# Patient Record
Sex: MALE | Race: WHITE | URBAN - METROPOLITAN AREA
[De-identification: names, ages, dates, MRNs, and addresses within clinical notes are randomized per-mention and may not be internally consistent; named-entity substitution may affect disease eponyms.]

---

## 2017-05-26 ENCOUNTER — HOSPITAL ENCOUNTER (OUTPATIENT)
Dept: PHYSICAL MEDICINE AND REHAB | Facility: CLINIC | Age: 52
Discharge: HOME | End: 2017-05-26
Attending: NURSE PRACTITIONER

## 2017-05-26 DIAGNOSIS — M54.41 CHRONIC RIGHT-SIDED LOW BACK PAIN WITH RIGHT-SIDED SCIATICA: ICD-10-CM

## 2017-05-26 DIAGNOSIS — M51.369 DEGENERATIVE DISC DISEASE, LUMBAR: ICD-10-CM

## 2017-05-26 DIAGNOSIS — M47.816 LUMBAR FACET ARTHROPATHY: ICD-10-CM

## 2017-05-26 DIAGNOSIS — M79.18 MYOFASCIAL PAIN: ICD-10-CM

## 2017-05-26 DIAGNOSIS — G89.29 CHRONIC RIGHT-SIDED LOW BACK PAIN WITH RIGHT-SIDED SCIATICA: ICD-10-CM

## 2017-05-26 DIAGNOSIS — M51.369 ANNULAR TEAR OF LUMBAR DISC: ICD-10-CM

## 2017-06-20 ENCOUNTER — HOSPITAL ENCOUNTER (OUTPATIENT)
Dept: PHYSICAL MEDICINE AND REHAB | Facility: CLINIC | Age: 52
Discharge: HOME | End: 2017-06-20
Attending: PHYSICIAN ASSISTANT

## 2017-06-20 ENCOUNTER — OFFICE VISIT (OUTPATIENT)
Dept: FAMILY MEDICINE | Facility: CLINIC | Age: 52
End: 2017-06-20

## 2017-06-20 VITALS
SYSTOLIC BLOOD PRESSURE: 130 MMHG | HEIGHT: 74 IN | RESPIRATION RATE: 16 BRPM | BODY MASS INDEX: 23.72 KG/M2 | HEART RATE: 59 BPM | DIASTOLIC BLOOD PRESSURE: 81 MMHG | WEIGHT: 184.8 LBS | TEMPERATURE: 98 F | OXYGEN SATURATION: 100 %

## 2017-06-20 DIAGNOSIS — Z00.00 ROUTINE GENERAL MEDICAL EXAMINATION AT A HEALTH CARE FACILITY: Primary | ICD-10-CM

## 2017-06-20 DIAGNOSIS — M54.16 LUMBAR RADICULOPATHY: ICD-10-CM

## 2017-06-20 DIAGNOSIS — M54.50 LUMBAR PAIN: ICD-10-CM

## 2017-06-20 DIAGNOSIS — K13.70 MOUTH LESION: ICD-10-CM

## 2017-06-20 DIAGNOSIS — Z12.11 COLON CANCER SCREENING: ICD-10-CM

## 2017-06-20 LAB
BUN SERPL-MCNC: 9 MG/DL (ref 7–30)
CALCIUM SERPL-MCNC: 9.6 MG/DL (ref 8.5–10.4)
CHLORIDE SERPLBLD-SCNC: 100 MMOL/L (ref 94–109)
CHOLEST SERPL-MCNC: 173 MG/DL
CHOLEST/HDLC SERPL: 2.3 RATIO
CO2 SERPL-SCNC: 28 MMOL/L (ref 20–32)
CREAT SERPL-MCNC: 0.9 MG/DL (ref 0.8–1.5)
EGFR CALCULATED (BLACK REFERENCE): >90 ML/MIN
EGFR CALCULATED (NON BLACK REFERENCE): >90 ML/MIN
GLUCOSE SERPL-MCNC: 102 MG/DL (ref 60–109)
HDLC SERPL-MCNC: 76 MG/DL
LDLC SERPL CALC-MCNC: 81 MG/DL (ref 0–99)
POTASSIUM SERPL-SCNC: 4.6 MMOL/L (ref 3.4–5.3)
SODIUM SERPL-SCNC: 140 MMOL/L (ref 133–144)
TRIGL SERPL-MCNC: 79 MG/DL
VLDL-CHOLESTEROL: 16 MG/DL (ref 7–32)

## 2017-06-20 RX ORDER — IBUPROFEN 200 MG
400-600 TABLET ORAL EVERY 6 HOURS PRN
Qty: 100 TABLET | COMMUNITY
Start: 2017-06-20

## 2017-06-20 RX ORDER — LORATADINE 10 MG/1
10 TABLET ORAL DAILY
Qty: 30 TABLET | Refills: 1 | COMMUNITY
Start: 2017-06-20

## 2017-06-20 ASSESSMENT — MIFFLIN-ST. JEOR: SCORE: 1723.96

## 2017-06-20 NOTE — PATIENT INSTRUCTIONS
If mouth sore not improving, please give us a call in one week    Referral for Gastroenterology faxed to [facility] and pt will be contacted to schedule appointment.  Preventive Health Recommendations  Male Ages 50 - 64    Yearly exam:             See your health care provider every year in order to  o   Review health changes.   o   Discuss preventive care.    o   Review your medicines if your doctor has prescribed any.     Have a cholesterol test every 5 years, or more frequently if you are at risk for high cholesterol/heart disease.     Have a diabetes test (fasting glucose) every three years. If you are at risk for diabetes, you should have this test more often.     Have a colonoscopy at age 50, or have a yearly FIT test (stool test). These exams will check for colon cancer.      Talk with your health care provider about whether or not a prostate cancer screening test (PSA) is right for you.    You should be tested each year for STDs (sexually transmitted diseases), if you re at risk.     Shots: Get a flu shot each year. Get a tetanus shot every 10 years.     Nutrition:    Eat at least 5 servings of fruits and vegetables daily.     Eat whole-grain bread, whole-wheat pasta and brown rice instead of white grains and rice.     Talk to your provider about Calcium and Vitamin D.     Lifestyle    Exercise for at least 150 minutes a week (30 minutes a day, 5 days a week). This will help you control your weight and prevent disease.     Limit alcohol to one drink per day.     No smoking.     Wear sunscreen to prevent skin cancer.     See your dentist every six months for an exam and cleaning.     See your eye doctor every 1 to 2 years.

## 2017-06-20 NOTE — LETTER
June 21, 2017      Blas Ramirez  [address] [facility] [address]        Dear Blas,    Your blood work all (cholesterol, electrolytes & kidney function) all looks excellent.   Let us know if the mouth sore is not improving.     Please see below for your test results.    Resulted Orders   Lipid Panel (LabDAQ)   Result Value Ref Range    Cholesterol 173.0 <200.0 mg/dL    Triglycerides 79.0 <150.0 mg/dL    HDL Cholesterol 76.0 >40.0 mg/dL      Comment:      If diabetic or CVD then reference range <100    VLDL-Cholesterol 16.0 7.0 - 32.0 mg/dL    LDL Cholesterol Direct 81.0 0.0 - 99.0 mg/dL    Cholesterol/HDL Ratio 2.3 <5.0 RATIO   Basic Metabolic Panel (LabDAQ)   Result Value Ref Range    Glucose 102.0 60.0 - 109.0 mg/dL    Urea Nitrogen 9.0 7.0 - 30.0 mg/dL    Creatinine 0.9 0.8 - 1.5 mg/dL    Sodium 140.0 133.0 - 144.0 mmol/L    Potassium 4.6 3.4 - 5.3 mmol/L    Chloride 100.0 94.0 - 109.0 mmol/L    Carbon Dioxide 28.0 20.0 - 32.0 mmol/L    Calcium 9.6 8.5 - 10.4 mg/dL    eGFR Calculated (Non Black Reference) >90 >60.0 mL/min    eGFR Calculated (Black Reference) >90 >60.0 mL/min       If you have any questions, please call the clinic to make an appointment.    Sincerely,    Andrei Mejias MD

## 2017-06-20 NOTE — PROGRESS NOTES
Male Physical Note         HPI       Concerns today:     Sore throat: ongoing since Sunday morning. Was at a few concerts over the weekend. Feeling fatigued. No fever. Swelling on top of his mouth as if he burnt the roof of the mouth. Some difficulty swallowing. Ears have been popping. No rhinorrhea. A few dry coughs but very minimal.    Back pain: [facility] been [facility] the past year, s/p 2 steroid injections over past year and another course of physical therapy.            Review of Systems:   CONSTITUTIONAL: no fatigue, no unexpected change in weight  SKIN: no worrisome rashes, moles, or lesions  EYES: no acute vision problems or changes, sees optometrist regularily   ENT: no ear, mouth or throat problems, sees dentist regularily (last 1 year ago)  RESP: no significant cough, no shortness of breath  BREAST: no masses, tenderness or discharge  CV: no chest pain or palpitations, no new or worsening peripheral edema  GI: no nausea, vomiting, constipation, or diarrhea   male: negative for dysuria, hematuria, decreased urinary stream, erectile dysfunction, urethral discharge, or nocturia  MS: no myalgia or arthralgia.  Some soreness on inside of elbows b/l for years, fairly mild.  NEURO: no weakness, dizziness, syncope, or headaches  PSYCHIATRIC: no changes in mood or affect, see PHQ-2    Patient Active Problem List   Diagnosis     Low back pain     External hemorrhoids     Active problem list reviewed and updated.    Past Medical History:   Diagnosis Date     External hemorrhoids 10/21/2013     LBP (low back pain) 10/21/2013    Several years. Worsened with physical activity (running), so is doing more swimming and biking. MRI at [facility] 2010 with 2 herniated discs, but no current neuro symptoms. PT referral for strengthening, etc.       Past medical history reviewed and updated.     Past Surgical History:   Procedure Laterality Date     HC REMOVAL ADENOIDS,PRIMARY,<11 Y/O       Past surgical history  "reviewed and updated.    Family History     Problem (# of Occurrences) Relation (Name,Age of Onset)    C.A.D. (1) Paternal Grandfather: MI in 70s    Colon Cancer (1) Maternal Grandmother: 70s    Hypertension (2) Paternal Grandfather, Father      No hx of prostate cancer.    Family history reviewed and updated.  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    Has anyone hurt you physically, for example by pushing, hitting, slapping or kicking you or forcing you to have sex? Denies  Do you feel threatened or controlled by a partner, ex-partner or anyone in your life? Denies    RISK BEHAVIORS AND HEALTHY HABITS:  Tobacco Use/Smoking: None  Illicit Drug Use: None  Do you use alcohol?  Yes as above  Diet (5-7 servings of fruits/veg daily): Yes. Avoiding   Exercise (30 min accumulated most days):Yes  Dental Care: Yes   Calcium 1500 mg/d:  Yes  Seat Belt Use: Yes     Immunization History   Administered Date(s) Administered     Influenza (IIV3) 09/24/2013     Tdap (Adacel,Boostrix) 06/03/2010            Physical Exam:   /81 (BP Location: Right arm)  Pulse 59  Temp 98  F (36.7  C) (Oral)  Resp 16  Ht 6' 1.75\" (187.3 cm)  Wt 184 lb 12.8 oz (83.8 kg)  SpO2 100%  BMI 23.89 kg/m2  GENERAL: healthy, alert and in no distress  EYES: Eyes grossly normal to inspection, extraocular movements intact, PERRL  HENT: ear canals clear with pearly grey TMs without bulging or erythema, moist mucous membranes with good dentition, R upper palate with about a 5-8mm area of fluctuance and some overlying erythema. Mildly tender.  Pharynx otherwise clear.  NECK: no cervical adenopathy or other masses, thyroid without enlargement or nodules  RESP: lungs clear to " auscultation - no wheezing or crackles, good air movement throughout  CV: regular rates and rhythm, normal S1 and S2 without murmur, click or rub  ABDOMEN: soft, non-tender, no hepatosplenomegaly, normal active bowel sounds  MS: no gross extremity deformities noted, no peripheral edema  SKIN: no suspicious lesions or rashes  NEURO: moving all extremities equally and without difficulty, grossly normal sensory exam, intact mentation, clear coherent speech, symmetric reflexes   BACK: no CVA tenderness, no paralumbar tenderness  : normal male genitalia without lesions or urethral discharge, no hernia  RECTAL: normal sphincter tone, no rectal masses, prostate of normal size, smooth, nontender without nodules or masses  PSYCH: Alert and oriented times 3, able to articulate logical thoughts, able to abstract reason, no tangential thoughts, no hallucinations or delusions, normal appropriate affect    Assessment and Plan    Blas was seen today for physical, pharyngitis and medication update.    Diagnoses and all orders for this visit:    Colon cancer screening  -     GASTROENTEROLOGY ADULT REF PROCEDURE ONLY    Routine general medical examination at a health care facility  -     Lipid Panel (LabDAQ)  -     Basic Metabolic Panel (LabDAQ)      If mouth sore not improving in 1 week, advised pt to see dentist or call us and I will placer referral for ENT.    GFR 80's at last check in 2013, will recheck today given ongoing NSAID use.     Preventative Health:  Cholesterol Level (>44 yo or at risk):  Recommended and patient accepted testing.  ASA use (>3% risk in 5 y):  Testing not indicated   Colon CA Screening (>50-75 ):  Recommended and patient accepted testing. Will schedule for colonoscopy.  Discussed risks, benefits, and current recommendations for PSA Screening. Patient declines testing at this time.  US for AAA (men 65-76 yo who ever smoked):  Testing not indicated   Lung CA Screening with low dose CT (55 - 80,  with >= 30 ppy smoking history who are current smokers or quit within last 15y - annual low dose CT) Testing not indicated   Hepatitis C Virus Screening (if born between 1945 and 1965):  Testing not indicated   Discussed TDAP, influenza, and pneumovax vaccinations today. Patient is up to date, will receive influenza vaccine when available.     Patient Active Problem List   Diagnosis     Low back pain     External hemorrhoids       Follow up in one year, sooner if problems arise.    Andrei Mejias MD R2  Precepted with: Jane Lizama MD

## 2017-06-20 NOTE — MR AVS SNAPSHOT
After Visit Summary   6/20/2017    Blas Ramirez    MRN: [id number]           Patient Information     Date Of Birth          1965        Visit Information        Provider Department      6/20/2017 8:40 AM Adnrei Mejias MD [facility]        Today's Diagnoses     Colon cancer screening    -  1    Routine general medical examination at a health care facility          Care Instructions    If mouth sore not improving, please give us a call in one week    Referral for Gastroenterology faxed to [facility] and pt will be contacted to schedule appointment.  Preventive Health Recommendations  Male Ages 50 - 64    Yearly exam:             See your health care provider every year in order to  o   Review health changes.   o   Discuss preventive care.    o   Review your medicines if your doctor has prescribed any.     Have a cholesterol test every 5 years, or more frequently if you are at risk for high cholesterol/heart disease.     Have a diabetes test (fasting glucose) every three years. If you are at risk for diabetes, you should have this test more often.     Have a colonoscopy at age 50, or have a yearly FIT test (stool test). These exams will check for colon cancer.      Talk with your health care provider about whether or not a prostate cancer screening test (PSA) is right for you.    You should be tested each year for STDs (sexually transmitted diseases), if you re at risk.     Shots: Get a flu shot each year. Get a tetanus shot every 10 years.     Nutrition:    Eat at least 5 servings of fruits and vegetables daily.     Eat whole-grain bread, whole-wheat pasta and brown rice instead of white grains and rice.     Talk to your provider about Calcium and Vitamin D.     Lifestyle    Exercise for at least 150 minutes a week (30 minutes a day, 5 days a week). This will help you control your weight and prevent disease.     Limit alcohol to one drink per day.     No smoking.     Wear sunscreen  to prevent skin cancer.     See your dentist every six months for an exam and cleaning.     See your eye doctor every 1 to 2 years.            Follow-ups after your visit        Additional Services     GASTROENTEROLOGY ADULT REF PROCEDURE ONLY       Last Lab Result: Creatinine (mg/dL)       Date                     Value                 10/21/2013               1.0              ----------  Body mass index is 23.89 kg/(m^2).     Needed:  No  Language:  English    Patient will be contacted to schedule procedure.     Please be aware that coverage of these services is subject to the terms and limitations of your health insurance plan.  Call member services at your health plan with any benefit or coverage questions.  Any procedures must be performed at a [facility] facility OR coordinated by your clinic's referral office.    Please bring the following with you to your appointment:    (1) Any X-Rays, CTs or MRIs which have been performed.  Contact the facility where they were done to arrange for  prior to your scheduled appointment.    (2) List of current medications   (3) This referral request   (4) Any documents/labs given to you for this referral                  Who to contact     Please call your clinic at [phone] to:    Ask questions about your health    Make or cancel appointments    Discuss your medicines    Learn about your test results    Speak to your doctor   If you have compliments or concerns about an experience at your clinic, or if you wish to file a complaint, please contact [facility] Patient Relations at [phone] or email us at [email or web address]         Additional Information About Your Visit        [employer] Information     [employer] gives you secure access to your electronic health record. If you see a primary care provider, you can also send messages to your care team and make appointments. If you have questions, please call your primary  "care clinic.  If you do not have a primary care provider, please call [phone] and they will assist you.      [employer] is an electronic gateway that provides easy, online access to your medical records. With [employer], you can request a clinic appointment, read your test results, renew a prescription or communicate with your care team.     To access your existing account, please contact your [facility] or call [phone] for assistance.        Care EveryWhere ID     This is your Care EveryWhere ID. This could be used by other organizations to access your [facility] medical records  [id number]        Your Vitals Were     Pulse Temperature Respirations Height Pulse Oximetry BMI (Body Mass Index)    59 98  F (36.7  C) (Oral) 16 6' 1.75\" (187.3 cm) 100% 23.89 kg/m2       Blood Pressure from Last 3 Encounters:   06/20/17 130/81   06/03/16 128/77   02/19/16 133/82    Weight from Last 3 Encounters:   06/20/17 184 lb 12.8 oz (83.8 kg)   06/03/16 187 lb (84.8 kg)   02/19/16 195 lb 6.4 oz (88.6 kg)              We Performed the Following     Basic Metabolic Panel (LabDAQ)     GASTROENTEROLOGY ADULT REF PROCEDURE ONLY     Lipid Panel (LabDAQ)          Today's Medication Changes          These changes are accurate as of: 6/20/17  9:38 AM.  If you have any questions, ask your nurse or doctor.               These medicines have changed or have updated prescriptions.        Dose/Directions    CLARITIN 10 MG tablet   This may have changed:  Another medication with the same name was removed. Continue taking this medication, and follow the directions you see here.   Used for:  Routine general medical examination at a health care facility   Generic drug:  loratadine   Changed by:  Andrei Mejias MD        Dose:  10 mg   Take 1 tablet (10 mg) by mouth daily   Quantity:  30 tablet   Refills:  1         Stop taking these medicines if you haven't already. Please contact your care team if you have " questions.     Adult Blood Pressure Cuff Lg Kit   Stopped by:  Andrei Mejias MD           clobetasol 0.05 % cream   Commonly known as:  TEMOVATE   Stopped by:  Andrei Mejias MD           eucerin cream   Stopped by:  Andrei Mejias MD           ketoconazole 2 % shampoo   Commonly known as:  NIZORAL   Stopped by:  Andrei Mejias MD           naproxen 500 MG tablet   Commonly known as:  NAPROSYN   Stopped by:  Andrei Mejias MD           triamcinolone 0.1 % ointment   Commonly known as:  KENALOG   Stopped by:  Andrei Mejias MD                    Primary Care Provider Office Phone # Fax #    Elvin Roger Dunaway [phone]       [facility] [address] [place] [address]        Thank you!     Thank you for choosing [facility]  for your care. Our goal is always to provide you with excellent care. Hearing back from our patients is one way we can continue to improve our services. Please take a few minutes to complete the written survey that you may receive in the mail after your visit with us. Thank you!             Your Updated Medication List - Protect others around you: Learn how to safely use, store and throw away your medicines at www.disposemymeds.org.          This list is accurate as of: 6/20/17  9:38 AM.  Always use your most recent med list.                   Brand Name Dispense Instructions for use    CERAVE SA RENEWING Crea     340 g    Externally apply topically 2 times daily as needed (skin irritation)       CLARITIN 10 MG tablet   Generic drug:  loratadine     30 tablet    Take 1 tablet (10 mg) by mouth daily       ibuprofen 200 MG tablet    ADVIL/MOTRIN    100 tablet    Take 2-3 tablets (400-600 mg) by mouth every 6 hours as needed for mild pain

## 2017-06-21 ENCOUNTER — HOSPITAL ENCOUNTER (OUTPATIENT)
Dept: MRI IMAGING | Facility: HOSPITAL | Age: 52
Discharge: HOME | End: 2017-06-21
Attending: PHYSICIAN ASSISTANT

## 2017-06-21 DIAGNOSIS — M54.50 LUMBAR PAIN: ICD-10-CM

## 2017-06-21 NOTE — PROGRESS NOTES
Preceptor Attestation:  Patient's case reviewed and discussed with  Patient seen and discussed with the resident..  I agree with written assessment and plan of care.  Supervising Physician:  Lynsey Lizama MD  [facility]

## 2017-07-10 ENCOUNTER — OFFICE VISIT (OUTPATIENT)
Dept: FAMILY MEDICINE | Facility: CLINIC | Age: 52
End: 2017-07-10

## 2017-07-10 VITALS
TEMPERATURE: 98.1 F | BODY MASS INDEX: 23.33 KG/M2 | WEIGHT: 181.8 LBS | DIASTOLIC BLOOD PRESSURE: 76 MMHG | HEART RATE: 66 BPM | OXYGEN SATURATION: 98 % | HEIGHT: 74 IN | SYSTOLIC BLOOD PRESSURE: 118 MMHG | RESPIRATION RATE: 16 BRPM

## 2017-07-10 DIAGNOSIS — S01.01XD LACERATION OF SCALP, SUBSEQUENT ENCOUNTER: Primary | ICD-10-CM

## 2017-07-10 DIAGNOSIS — R20.9 DISTURBANCE OF SKIN SENSATION: ICD-10-CM

## 2017-07-10 NOTE — PROGRESS NOTES
Preceptor Attestation:  Patient's case reviewed and discussed with Kyra Martinez MD Patient seen and discussed with the resident.. I agree with assessment and plan of care.  Supervising Physician:  Cole Hays MD  [facility]

## 2017-07-10 NOTE — PROGRESS NOTES
"       HPI:       Blas Ramirez is a 52 year old  male with a significant past medical history of head laceration who presents for follow up concern of stitch removal and a new concern of strange sensation in right arm.     Had a Blood draw in the right arm and initially had shooting numbness in index finger. now tingling sensation, numb down the arm from elbow, to index finger when extending arm.   Feels slightly better, but constantly on side of index finger.   Moving the arm fine.   Feels a tugging sensation.   Had a small bruise at the time.    Working out of town, so hasn't been in to get stitches out. No HA or blurry vision. Had some swelling initially but this has gone down.           PMHX:     Patient Active Problem List   Diagnosis     Low back pain     External hemorrhoids       Current Outpatient Prescriptions   Medication Sig Dispense Refill     loratadine (CLARITIN) 10 MG tablet Take 1 tablet (10 mg) by mouth daily 30 tablet 1     ibuprofen (ADVIL/MOTRIN) 200 MG tablet Take 2-3 tablets (400-600 mg) by mouth every 6 hours as needed for mild pain 100 tablet      Emollient (CERAVE SA RENEWING) CREA Externally apply topically 2 times daily as needed (skin irritation) 340 g 1          Allergies   Allergen Reactions     Penicillins Swelling              Review of Systems:   As above          Physical Exam:     Vitals:    07/10/17 1011   BP: 118/76   Pulse: 66   Resp: 16   Temp: 98.1  F (36.7  C)   TempSrc: Oral   SpO2: 98%   Weight: 181 lb 12.8 oz (82.5 kg)   Height: 6' 1.6\" (186.9 cm)     Body mass index is 23.6 kg/(m^2).    Vitals reviewed and normal.  GENERAL APPEARANCE: healthy, alert and no distress  EYES: Eyes grossly normal to inspection, PERRL and conjunctivae and sclerae normal  HENT:Posterior occiput with well healed scab over small laceration. No surrounding erythema or swelling. Two staples removed without incident. No bleeding.   RESP:Breathing comfortably in room air.  CV: peripheral " pulses 2+  MS: no musculoskeletal defects are noted and gait is age appropriate without ataxia. Tinel's negative on right.   SKIN: no suspicious lesions or rashes  NEURO: Normal strength and tone, sensory exam grossly normal, mentation intact and speech normal  PSYCH: mentation appears normal and affect normal/bright     Office Visit on 06/20/2017   Component Date Value Ref Range Status     Cholesterol 06/20/2017 173.0  <200.0 mg/dL Final     Triglycerides 06/20/2017 79.0  <150.0 mg/dL Final     HDL Cholesterol 06/20/2017 76.0  >40.0 mg/dL Final    If diabetic or CVD then reference range <100     VLDL-Cholesterol 06/20/2017 16.0  7.0 - 32.0 mg/dL Final     LDL Cholesterol Direct 06/20/2017 81.0  0.0 - 99.0 mg/dL Final     Cholesterol/HDL Ratio 06/20/2017 2.3  <5.0 RATIO Final     Glucose 06/20/2017 102.0  60.0 - 109.0 mg/dL Final     Urea Nitrogen 06/20/2017 9.0  7.0 - 30.0 mg/dL Final     Creatinine 06/20/2017 0.9  0.8 - 1.5 mg/dL Final     Sodium 06/20/2017 140.0  133.0 - 144.0 mmol/L Final     Potassium 06/20/2017 4.6  3.4 - 5.3 mmol/L Final     Chloride 06/20/2017 100.0  94.0 - 109.0 mmol/L Final     Carbon Dioxide 06/20/2017 28.0  20.0 - 32.0 mmol/L Final     Calcium 06/20/2017 9.6  8.5 - 10.4 mg/dL Final     eGFR Calculated (Non Black Referen* 06/20/2017 >90  >60.0 mL/min Final     eGFR Calculated (Black Reference) 06/20/2017 >90  >60.0 mL/min Final       Assessment and Plan     (S01.01XD) Laceration of scalp, subsequent encounter  (primary encounter diagnosis)- well healed lac.  Plan: staples removed.   Advised normal bathing.     (R20.9) Disturbance of skin sensation- Tingling/numbness may be related to nerve irritation from blood draw. Distribution consistent with carpal tunnel but story certainly not classic and no objective findings at this time. Possible unrelated to blood draw and is neuronal irritation related to elbow joint or shoulder pathology though no pain at either site.   Plan: Continue to  observe for at least additional 3 weeks. Advised likely 6-8 weeks of healing if neuronal issues.   May need EMG if ongoing symptoms.     Options for treatment and follow-up care were reviewed with the patient and/or guardian. Blas Ramirez and/or guardian engaged in the decision making process and verbalized understanding of the options discussed and agreed with the final plan.    Kyra Martinez MD      Precepted today with: Cole Hays MD

## 2017-07-10 NOTE — MR AVS SNAPSHOT
After Visit Summary   7/10/2017    Blas Ramirez    MRN: [id number]           Patient Information     Date Of Birth          1965        Visit Information        Provider Department      7/10/2017 10:00 AM Kyra Martinez MD [facility]        Today's Diagnoses     Laceration of scalp, subsequent encounter    -  1    Disturbance of skin sensation           Follow-ups after your visit        Who to contact     Please call your clinic at [phone] to:    Ask questions about your health    Make or cancel appointments    Discuss your medicines    Learn about your test results    Speak to your doctor   If you have compliments or concerns about an experience at your clinic, or if you wish to file a complaint, please contact [facility] Patient Relations at [phone] or email us at [email or web address]         Additional Information About Your Visit        MyChart Information     [employer] gives you secure access to your electronic health record. If you see a primary care provider, you can also send messages to your care team and make appointments. If you have questions, please call your primary care clinic.  If you do not have a primary care provider, please call [phone] and they will assist you.      [employer] is an electronic gateway that provides easy, online access to your medical records. With [employer], you can request a clinic appointment, read your test results, renew a prescription or communicate with your care team.     To access your existing account, please contact your [facility] or call [phone] for assistance.        Care EveryWhere ID     This is your Care EveryWhere ID. This could be used by other organizations to access your [facility] medical records  [id number]        Your Vitals Were     Pulse Temperature Respirations Height Pulse Oximetry BMI (Body Mass Index)    66 98.1  F (36.7  C)  "(Oral) 16 6' 1.6\" (186.9 cm) 98% 23.6 kg/m2       Blood Pressure from Last 3 Encounters:   07/10/17 118/76   06/20/17 130/81   06/03/16 128/77    Weight from Last 3 Encounters:   07/10/17 181 lb 12.8 oz (82.5 kg)   06/20/17 184 lb 12.8 oz (83.8 kg)   06/03/16 187 lb (84.8 kg)              We Performed the Following     Suture Removal Only, charge        Primary Care Provider Office Phone # Fax #    Storm Greco [phone]       [facility] [address] [place] [address]        Equal Access to Services     JHON KNOTT : Hadii christopher whyte hadasho Soomaali, waaxda luqadaha, qaybta kaalmada adeegyada, waxay idiin hayninin latasha garcia . So [facility] [phone].    ATENCIÓN: Si habla español, tiene a peña disposición servicios gratuitos de asistencia lingüística. Llame al [phone].    We comply with applicable federal civil rights laws and [place] laws. We do not discriminate on the basis of race, color, national origin, age, disability sex, sexual orientation or gender identity.            Thank you!     Thank you for choosing [facility]  for your care. Our goal is always to provide you with excellent care. Hearing back from our patients is one way we can continue to improve our services. Please take a few minutes to complete the written survey that you may receive in the mail after your visit with us. Thank you!             Your Updated Medication List - Protect others around you: Learn how to safely use, store and throw away your medicines at www.disposemymeds.org.          This list is accurate as of: 7/10/17 11:59 PM.  Always use your most recent med list.                   Brand Name Dispense Instructions for use Diagnosis    CERAVE SA RENEWING Crea     340 g    Externally apply topically 2 times daily as needed (skin irritation)    Dermatitis       CLARITIN 10 MG tablet   Generic drug:  loratadine     30 tablet    Take 1 tablet (10 mg) by mouth daily    " Routine general medical examination at a health care facility       ibuprofen 200 MG tablet    ADVIL/MOTRIN    100 tablet    Take 2-3 tablets (400-600 mg) by mouth every 6 hours as needed for mild pain    Routine general medical examination at a health care facility

## 2017-07-14 ENCOUNTER — COMMUNICATION - HEALTHEAST (OUTPATIENT)
Dept: PHYSICAL MEDICINE AND REHAB | Facility: CLINIC | Age: 52
End: 2017-07-14

## 2017-07-18 ENCOUNTER — COMMUNICATION - HEALTHEAST (OUTPATIENT)
Dept: PHYSICAL MEDICINE AND REHAB | Facility: CLINIC | Age: 52
End: 2017-07-18

## 2017-07-19 ENCOUNTER — HOSPITAL ENCOUNTER (OUTPATIENT)
Dept: PHYSICAL MEDICINE AND REHAB | Facility: CLINIC | Age: 52
Discharge: HOME | End: 2017-07-19
Attending: PHYSICIAN ASSISTANT

## 2017-07-19 DIAGNOSIS — M54.16 LUMBAR RADICULOPATHY: ICD-10-CM

## 2017-07-21 ENCOUNTER — HOSPITAL ENCOUNTER (OUTPATIENT)
Dept: PHYSICAL MEDICINE AND REHAB | Facility: CLINIC | Age: 52
Discharge: HOME | End: 2017-07-21
Attending: ORTHOPAEDIC SURGERY

## 2017-07-21 DIAGNOSIS — M54.16 LUMBAR RADICULITIS: ICD-10-CM

## 2017-08-04 ENCOUNTER — COMMUNICATION - HEALTHEAST (OUTPATIENT)
Dept: PHYSICAL MEDICINE AND REHAB | Facility: CLINIC | Age: 52
End: 2017-08-04

## 2017-08-16 ENCOUNTER — HOSPITAL ENCOUNTER (OUTPATIENT)
Dept: PHYSICAL MEDICINE AND REHAB | Facility: CLINIC | Age: 52
Discharge: HOME | End: 2017-08-16
Attending: PHYSICAL MEDICINE & REHABILITATION

## 2017-08-16 DIAGNOSIS — M51.369 ANNULAR TEAR OF LUMBAR DISC: ICD-10-CM

## 2017-08-16 DIAGNOSIS — M54.50 CHRONIC RIGHT-SIDED LOW BACK PAIN WITHOUT SCIATICA: ICD-10-CM

## 2017-08-16 DIAGNOSIS — G89.29 CHRONIC RIGHT-SIDED LOW BACK PAIN WITHOUT SCIATICA: ICD-10-CM

## 2017-08-16 DIAGNOSIS — M51.369 DISC DEGENERATION, LUMBAR: ICD-10-CM

## 2017-08-16 ASSESSMENT — MIFFLIN-ST. JEOR: SCORE: 1723.96

## 2019-09-09 ENCOUNTER — OFFICE VISIT - HEALTHEAST (OUTPATIENT)
Dept: FAMILY MEDICINE | Facility: CLINIC | Age: 54
End: 2019-09-09

## 2019-09-09 DIAGNOSIS — Z13.220 ENCOUNTER FOR SCREENING FOR LIPOID DISORDERS: ICD-10-CM

## 2019-09-09 DIAGNOSIS — Z12.11 SCREEN FOR COLON CANCER: ICD-10-CM

## 2019-09-09 DIAGNOSIS — L30.8 OTHER ECZEMA: ICD-10-CM

## 2019-09-09 DIAGNOSIS — Z13.1 ENCOUNTER FOR SCREENING FOR DIABETES MELLITUS: ICD-10-CM

## 2019-09-09 DIAGNOSIS — Z23 NEED FOR VACCINATION: ICD-10-CM

## 2019-09-09 DIAGNOSIS — Z00.00 ENCOUNTER FOR GENERAL ADULT MEDICAL EXAMINATION WITHOUT ABNORMAL FINDINGS: ICD-10-CM

## 2019-09-09 LAB
CHOLEST SERPL-MCNC: 184 MG/DL
FASTING STATUS PATIENT QL REPORTED: YES
FASTING STATUS PATIENT QL REPORTED: YES
GLUCOSE BLD-MCNC: 105 MG/DL (ref 70–99)
HDLC SERPL-MCNC: 76 MG/DL
HIV 1+2 AB+HIV1 P24 AG SERPL QL IA: NEGATIVE
LDLC SERPL CALC-MCNC: 96 MG/DL
TRIGL SERPL-MCNC: 59 MG/DL

## 2019-09-09 ASSESSMENT — MIFFLIN-ST. JEOR: SCORE: 1773.4

## 2019-09-10 ENCOUNTER — COMMUNICATION - HEALTHEAST (OUTPATIENT)
Dept: FAMILY MEDICINE | Facility: CLINIC | Age: 54
End: 2019-09-10

## 2019-11-04 ENCOUNTER — HEALTH MAINTENANCE LETTER (OUTPATIENT)
Age: 54
End: 2019-11-04

## 2020-08-25 ENCOUNTER — RECORDS - HEALTHEAST (OUTPATIENT)
Dept: ADMINISTRATIVE | Facility: OTHER | Age: 55
End: 2020-08-25

## 2020-09-14 ENCOUNTER — OFFICE VISIT - HEALTHEAST (OUTPATIENT)
Dept: FAMILY MEDICINE | Facility: CLINIC | Age: 55
End: 2020-09-14

## 2020-09-14 DIAGNOSIS — Z13.220 ENCOUNTER FOR SCREENING FOR LIPOID DISORDERS: ICD-10-CM

## 2020-09-14 DIAGNOSIS — S16.1XXA STRAIN OF NECK MUSCLE, INITIAL ENCOUNTER: ICD-10-CM

## 2020-09-14 DIAGNOSIS — Z23 NEED FOR VACCINATION: ICD-10-CM

## 2020-09-14 DIAGNOSIS — M25.559 LATERAL PAIN OF HIP: ICD-10-CM

## 2020-09-14 DIAGNOSIS — Z13.1 ENCOUNTER FOR SCREENING FOR DIABETES MELLITUS: ICD-10-CM

## 2020-09-14 DIAGNOSIS — Z00.00 ROUTINE GENERAL MEDICAL EXAMINATION AT A HEALTH CARE FACILITY: ICD-10-CM

## 2020-09-14 LAB
CHOLEST SERPL-MCNC: 183 MG/DL
FASTING STATUS PATIENT QL REPORTED: YES
FASTING STATUS PATIENT QL REPORTED: YES
GLUCOSE BLD-MCNC: 84 MG/DL (ref 70–125)
HDLC SERPL-MCNC: 67 MG/DL
LDLC SERPL CALC-MCNC: 92 MG/DL
TRIGL SERPL-MCNC: 122 MG/DL

## 2020-09-14 ASSESSMENT — MIFFLIN-ST. JEOR: SCORE: 1812.52

## 2020-11-22 ENCOUNTER — HEALTH MAINTENANCE LETTER (OUTPATIENT)
Age: 55
End: 2020-11-22

## 2021-05-30 VITALS — BODY MASS INDEX: 24.41 KG/M2 | WEIGHT: 185 LBS

## 2021-05-31 VITALS — BODY MASS INDEX: 24.25 KG/M2 | HEIGHT: 73 IN | WEIGHT: 183 LBS

## 2021-06-02 ENCOUNTER — RECORDS - HEALTHEAST (OUTPATIENT)
Dept: ADMINISTRATIVE | Facility: CLINIC | Age: 56
End: 2021-06-02

## 2021-06-03 VITALS
TEMPERATURE: 98.2 F | DIASTOLIC BLOOD PRESSURE: 78 MMHG | BODY MASS INDEX: 25.7 KG/M2 | WEIGHT: 193.9 LBS | SYSTOLIC BLOOD PRESSURE: 120 MMHG | RESPIRATION RATE: 16 BRPM | HEART RATE: 64 BPM | HEIGHT: 73 IN

## 2021-06-04 VITALS
HEART RATE: 58 BPM | TEMPERATURE: 97.3 F | SYSTOLIC BLOOD PRESSURE: 120 MMHG | RESPIRATION RATE: 18 BRPM | DIASTOLIC BLOOD PRESSURE: 76 MMHG | HEIGHT: 74 IN | BODY MASS INDEX: 25.65 KG/M2 | WEIGHT: 199.9 LBS

## 2021-06-10 NOTE — PROGRESS NOTES
Assessment:   Diagnoses and all orders for this visit:    Chronic right-sided low back pain with right-sided sciatica  -     Ambulatory referral to Orthopedic Surgery    Annular tear of lumbar disc  -     Ambulatory referral to Orthopedic Surgery    Lumbar facet arthropathy  -     Ambulatory referral to Orthopedic Surgery    Myofascial pain  -     Ambulatory referral to Orthopedic Surgery    Degenerative disc disease, lumbar  -     Ambulatory referral to Orthopedic Surgery       Blas Ramirez is a 52 y.o. y.o. male with past medical history significant for asthma who presents today for follow-up regarding ongoing chronic most significant bilateral low back pain at the L3-4 level worse with bending as well as walking and running that is more consistent lately, still having some radicular symptoms into the right posterior buttock and posterior thigh, however that is less bothersome.    No relief previously with MedX program, gabapentin, minimal relief with NSAIDs.    Patient had failed medial branch block right L3, L4, L5.  And is neurologically intact on exam.     Plan:     A shared decision making plan was used. The patient's values and choices were respected. Prior medical records from 9/19/17 were reviewed today. The following represents what was discussed and decided upon by the provider and the patient.        -DIAGNOSTIC TESTS: Did discuss obtaining updated MRI prior to seeing Dr. Dyer, can have him see ALFRED Pemberton prior to obtaining new images however per patient request.  --Flexion/Extension x-ray revealed no instability.  --Updated lumbar MRI is not significantly changed from 2010 MRI that were still revealed L3-4 moderate disc space loss with borderline foraminal narrowing, L4-5 annular fissure with posterior bulge, borderline right foraminal narrowing mild left foraminal narrowing. Annular fissure is less conspicuous than prior study. L5-S1 only trace disc bulge noted without spinal canal  foraminal stenosis, however his symptoms are in an L5 and S1 pattern. No high-grade foraminal or spinal canal stenosis noted at any level.  Facet arthropathy also noted at L4-L5 and L5-S1.    -INTERVENTIONS: No further injections recommended at this time.    -REFERRAL: Referral sent to orthopedic surgery Dr. Dyer, advised patient to follow-up with Reji Chu PA first discuss surgical options, he will determine if updated MRI is reasonable the plan moving forward is to see Dr. Dyer.    -MEDICATIONS: Encouraged patient to continue ibuprofen as needed for pain.  -No further medications prescribed today.  Discussed side effects of medications and proper use. Patient verbalized understanding.    -PHYSICAL THERAPY: Encouraged patient to continue home exercise program from prior PT sessions  Discussed the importance of core strengthening, ROM, stretching exercises with the patient and how each of these entities is important in decreasing pain.  Explained to the patient that the purpose of physical therapy is to teach the patient a home exercise program.  These exercises need to be performed every day in order to decrease pain and prevent future occurrences of pain.        -PATIENT EDUCATION:  25 minutes of total visit time was spent face to face with the patient today, 60 % of the visit was spent on counseling, education, and coordinating care.     -FOLLOW UP: Follow-up with Reji Chu, then as needed with me.  Advised to contact clinic if symptoms worsen or change.    Subjective:     Blas Ramirez is a 52 y.o. male who presents today for follow-up regarding ongoing chronic axial low back pain at the L3-4 level that radiates into the lower lumbar spine at the lumbosacral junction as well that is currently a 7/10 up to an 8/10 at its worst more bothersome with bending, walking, running and is very constant in the last couple of months.  He is active however reports that the pain can be debilitating at time  and it does definitely limit his mobility.  He reports some occasional pain into the right posterior thigh as well however this is less bothersome and intermittent.    He denies any new symptoms, denies weakness, denies recent trips or falls, denies bowel or bladder dysfunction.    Treatment to Date:  Physical therapy MedX protocol ×9 sessions with minimal relief however he does feel overall stronger.    Right L3, L4, L5 medial branch block 8/29/2016, with FAILED response.  Preprocedure 7/10, postprocedure 3/10.  Right L3-4 TF RITA 9/29/2016 with no relief in symptoms.  Right L4-5 TF RITA with 50% relief.    Medications:  Ibuprofen occasionally daily or up to 3 times a day at a max with some relief.  Gabapentin 300 mg 3-3-3 with minimal relief, not taking.    Current Outpatient Prescriptions on File Prior to Encounter   Medication Sig Dispense Refill     CERAVE Crea cream as needed. OTC  1     clobetasol (TEMOVATE) 0.05 % cream as needed.  0     gabapentin (NEURONTIN) 300 MG capsule Take 3 capsules (900 mg total) by mouth 3 (three) times a day. Follow Gabapentin Dosing chart given 270 capsule 3     IBUPROFEN ORAL Take 1 tablet by mouth 2 (two) times a day as needed. OTC       LORATADINE (CLARITIN ORAL) Take 1 tablet by mouth as needed.       No current facility-administered medications on file prior to encounter.        Allergies   Allergen Reactions     Penicillins        Past Medical History:   Diagnosis Date     Asthma         Review of Systems  ROS: Specifically negative for bowel/bladder dysfunction, balance changes, headache, dizziness, foot drop, fevers, chills, appetite changes, nausea/vomiting, unexplained weight loss. Otherwise 13 systems reviewed are negative. Please see the patient's intake questionnaire from today for details.    Reviewed Social, Family, Past Medical and Past Surgical history with patient, no significant changes noted since prior visit.     Objective:   /81 (Patient Site: Left Arm,  Patient Position: Sitting)  Pulse 77  Wt 185 lb (83.9 kg)  BMI 24.41 kg/m2    PHYSICAL EXAMINATION:    --CONSTITUTIONAL: Well developed, well nourished, healthy appearing individual.  --PSYCHIATRIC: Appropriate mood and affect. No difficulty interacting due to temper, social withdrawal, or memory issues.  --SKIN: Lumbar region is dry and intact. Sensation to light touch is intact in the bilateral L4, L5, and S1 dermatomes.  --RESPIRATORY: Normal rhythm and effort. No abnormal accessory muscle breathing patterns noted.   --MUSCULOSKELETAL:  Normal lumbar lordosis noted, no lateral shift.  --GROSS MOTOR: Easily arises from a seated position.   --LUMBAR SPINE:  Inspection reveals no evidence of deformity. Range of motion is not limited in lumbar flexion, extension, or lateral rotation however he does have significant increased pain with forward flexing.  Normal tenderness to palpation. Straight leg raising in the supine position is negative to radicular pain. Sciatic notch non-tender.   --SACROILIAC JOINT: Negative distraction.  Negative Zeke's with reproduction of pain to affected extremity. One Finger point test negative.  --LOWER EXTREMITY MOTOR TESTING:  Plantar flexion left 5/5, right 5/5   Dorsiflexion left 5/5, right 5/5   Great toe MTP extension left 5/5, right 5/5  Knee flexion left 5/5, right 5/5  Knee extension left 5/5, right 5/5   Hip flexion left 5/5, right 5/5  Hip abduction left 5/5, right 5/5  Hip adduction left 5/5, right 5/5   --HIPS: Full range of motion bilaterally. Negative FABERs on the involved lower extremity.   --NEUROLOGIC: Bilateral patellar and achilles reflexes are physiologic and symmetric. Lower extremities are intact to light touch.     RESULTS:   Imaging: MRI of the lumbar spine was reviewed today. The images were shown to the patient and the findings were explained using a spine model.      Xr Lumbar Spine Flex And Ext 2 Or 3 Vws  Result Date: 10/17/2016  INDICATION: Lumbar pain.  COMPARISON: MRI lumbar spine 08/17/2016. FINDINGS: 5 lumbar type vertebral bodies. Normal lordosis. Vertebral body heights are normal. Moderate disc space narrowing L3-L4 and mild disc space narrowing L4-L5.   Between flexion and extension, physiologic motion at L1-L2 and L2-L3.   There is 3 mm retrolisthesis L3-L4 that is unchanged between flexion and extension. Very little movement at this level.   There is 3 mm retrolisthesis L4-L5 that is also unchanged between flexion and extension.   Physiologic motion at L5-S1.   No instability pattern identified.      Mr Lumbar Spine Without Contrast  Result Date: 8/18/2016  [facility] MR LUMBAR SPINE WO CONTRAST 8/17/2016 5:41 PM INDICATION: Chronic right-sided low back pain with right-sided sciatica. TECHNIQUE: Multiplanar T1- and T2-weighted images were obtained through the lumbar spine. CONTRAST: None. SEDATION: None. COMPARISON: MRI lumbar spine 06/07/2010. FINDINGS: Exam assumes 5 lumbar type vertebral bodies. Lateral alignment is normal. Stable straightening of the normal lumbar lordosis. Moderate endplate irregularity apposing L3-L4 and L4-L5 endplates with Modic type II changes L3-L4 and mixed Modic type I and Modic type II changes at L4-L5. Minor chronic endplate irregularity apposing L1-L2 endplates. Vertebral body heights otherwise preserved. No pars defects. Dorsal paraspinal musculature and psoas muscles are robust. Visualized aorta is normal in caliber. Visualized osseous pelvis and proximal femora are unremarkable. Distal spinal cord and cauda equina are normal in appearance. Conus terminates at L1. T12-L1: Normal disc height. Slight loss of normal disc T2 prolongation. No disc herniation. No facet arthropathy. No spinal canal or neural foraminal stenosis. Appearance of the level is stable. L1-L2: Normal disc height. Loss of normal disc T2 prolongation. No disc herniation. No facet arthropathy. No spinal canal or neural foraminal stenosis. Slight  progression in the degree of disc signal loss. L2-L3: Normal disc height. Mild loss of normal disc T2 prolongation. No significant disc herniation. No facet arthropathy. No spinal canal or neural foraminal stenosis. Slight progression in degree of disc signal loss. L3-L4: Moderate interspace narrowing. Loss of normal disc T2 prolongation. Shallow annular disc bulge. No facet arthropathy. No spinal canal stenosis. Borderline foraminal narrowing. Progression in the degree of interspace narrowing with new borderline foraminal narrowing. L4-L5: Normal disc height. Loss of normal disc T2 prolongation. Shallow broad-based posterior disc bulge with small central posterior annular fissure. Minor posterior interbody spurring. Mild effacement of the caudal foramina. Mild facet arthropathy on the left. No spinal canal stenosis. Borderline right foraminal narrowing. Mild left foraminal narrowing. Annular fissure is less conspicuous than on the prior. L5-S1: Normal disc height. Slight loss of normal disc T2 prolongation. Trace posterior disc bulge. Mild right-sided facet arthropathy. No spinal canal or neural foraminal stenosis. Appearance to the level is stable.   Conclusion:  1. Stable nonspecific straightening of the normal lumbar lordosis. 2. Moderate disc and endplate degenerative changes L3-L4 and L4-L5, mildly progressed in the interval. 3. Shallow disc osteophyte complexes L3-L4 and L4-L5 slightly effaces the caudal ventral foramina. New borderline foraminal narrowing L3-L4 with similar borderline right and mild left foraminal narrowing L4-L5. No canal stenosis.         Lumbar Spine MRI without contrast 6/7/2010  Impression:  1. Moderate endplate degenerative changes seen at L3-4, shallow broad-based posterior disc bulge at this level slightly effaces the caudal neural foramina, but does not result in significant foraminal or central canal stenosis.  2. Loss of normal disc T2 prolongation and small annular disc bulge and  central posterior tear are seen at the L4-5 level. There is partial effacement of the caudal neural foramina in conjunction with mild facet arthropathy with result in mild bilateral neural foraminal narrowing. No canal stenosis.  8. L5-S1 very shallow central disc bulge without foraminal or canal stenosis. Mild facet arthropathy. Mild disc bulge also noted at L3-4.

## 2021-06-10 NOTE — PROGRESS NOTES
F/U from 11/7/16  --C/O B/L low back pain, radiates down the right posterior thigh (recurrent), worsening  --Sharp stabbing pain in the right low back and soreness in the left low back per pt  --Rates pain 7/10  --PT x 9 sessions [facility] 9/6/2016  --Last injection 10/20/16 right L4-L5 TFESI    Medication  --No pain meds  --D/C 2016 Gabapentin 300 mg 3 caps TID, no relief per pt

## 2021-06-11 NOTE — PROGRESS NOTES
SPINE SURGERY CONSULTATION NOTE    6/26/2017     Blas Ramirez is a 52 y.o. male who is sent to us in consultation by Alecia Clement NP    CC: low back pain       HPI: Blas Ramirez is a 52 y.o. male who presents today for follow-up regarding ongoing chronic axial low back pain at the L3-4 level that radiates into the lower lumbar spine at the lumbosacral junction as well that is currently a 7/10 up to an 8/10 at its worst more bothersome with bending, walking, running and is very constant in the last couple of months.  He is active however reports that the pain can be debilitating at time and it does definitely limit his mobility.  He has undergone various epidural injections without significant relief.  He is here to discuss what surgical options may be available to him.  No LE weakness.  No saddle anaesthesia. No bowel/bladder incontinence. Most activities will exacerbate his pain. Nothing seems to offer him relief. The predominance of his pain is axial in nature.    He reports some occasional pain into the right posterior thigh as well however this is less bothersome and intermittent.     He denies any new symptoms, denies weakness, denies recent trips or falls, denies bowel or bladder dysfunction.       Past Medical History:   Diagnosis Date     Asthma      No past surgical history on file.      REVIEW OF SYSTEMS:  ROS reviewed with pt. No fevers, chills, night sweats, unintentionally weight loss. No difficulty with gait or fine motor skills.  No bowel or bladder incontinence.  Negative cardiac, pulmonary, hematological.  All other systems are negative other than that stated in the HPI.         MEDICATIONS:  Current Outpatient Prescriptions   Medication Sig Dispense Refill     CERAVE Crea cream as needed. OTC  1     clobetasol (TEMOVATE) 0.05 % cream as needed.  0     IBUPROFEN ORAL Take 1 tablet by mouth 2 (two) times a day as needed. OTC       LORATADINE (CLARITIN ORAL) Take 1 tablet by  "mouth as needed.       No current facility-administered medications for this encounter.          ALLERGIES/SENSITIVITIES:     Allergies   Allergen Reactions     Penicillins        PERTINENT SOCIAL HISTORY:   Social History     Social History     Marital status:      Spouse name: N/A     Number of children: N/A     Years of education: N/A     Social History Main Topics     Smoking status: Never Smoker     Smokeless tobacco: None     Alcohol use None     Drug use: None     Sexual activity: Not Asked     Other Topics Concern     None     Social History Narrative         FAMILY HISTORY:  Family History   Problem Relation Age of Onset     Cancer Father      Diabetes Father      Heart disease Maternal Grandfather      Stroke Paternal Grandfather         PHYSICAL EXAM:   Constitutional: /60 (Patient Site: Right Arm, Patient Position: Sitting)  Pulse 71  Ht 6' 1\" (1.854 m)  Wt 183 lb (83 kg)  BMI 24.14 kg/m2     Mental Status: A & O in no acute distress.  Affect is appropriate.  Speech is fluent.  Recent and remote memory are intact.  Attention span and concentration are normal.    HEAD: normocephalic, atraumatic    EYES: PERRL, EOM full    NECK: supple, trachea midline normal ROM    EXTREMITIES: warm, well-perfused    Skin:  No obvious skin lesions/rashes.     Musculoskeletal: Tender to palpation throughout lumbar spine.  ROM limited in all directions.     Motor: No pronator drift of upper extremity. Normal bulk and tone all muscle groups of upper and lower extremities. Strength 5/5 bilateral hip flexion, knee extension, dorsiflexion, extensor hallucis longus, plantar flexion and bilateral deltoid, bicep, wrist extensor, tricep, finger flexion, finger abduction, .     Sensory: Sensation intact bilaterally to light touch.      Coordination:  Heel/toe/  gait intact.       Reflexes: supinator, biceps, triceps, knee/ ankle jerk intact. nml hoffmans/ neg    babinski/ clonus.    IMAGING: I personally " reviewed all radiographic images    MRI lumbar spine:  IMPRESSION:   1. Stable nonspecific straightening of the normal lumbar lordosis. 2. Moderate disc and endplate degenerative changes L3-L4 and L4-L5, mildly progressed in the interval.  3. Shallow disc osteophyte complexes L3-L4 and L4-L5 slightly effaces the caudal ventral foramina. New borderline foraminal narrowing L3-L4 with similar borderline right and mild left foraminal narrowing L4-L5. No canal stenosis.        CONSULTATION ASSESSMENT AND PLAN:    Chronic low back pain with advanced disc degeration at L3-4  -I discussed with Mr Ramirez that based on the severity of his reported low back pain and severe disc degeneration at L3-4 he may benefit from a lumbar fusion.  I will update his lumbar MRI, have him obtain discography L3-S1 and f/u with Dr Dyer.  We spent the majority of our visit discussing the various surgical and non surgical options.  We discussed a lumbar fusion at great length and what to expect post operatively.  All of his questions were answered.    I spent more than 45 minutes in this apt, examining the pt, reviewing the scans, reviewing notes from chart, discussing treatment options with risks and benefits and coordinating care. >50 % clinic time was spent in face to face counseling and coordinating care    Reji Chu /Dr. Gomez MD      Cc:   Ángela London MD   [id number]  [place] [id number]

## 2021-06-12 NOTE — PROGRESS NOTES
SPINE SURGERY FOLLOWUP  NOTE    Blas Ramirez comes today in f/u after prior apt for the evaluation of a discogram.  He has long standing back pain.  The discogram was positive at L4-5 greater than L3-4      HPI    Blas Ramirez is a 52 y.o. male who presents today for follow-up regarding ongoing chronic axial low back pain at the L3-4 level that radiates into the lower lumbar spine at the lumbosacral junction as well that is currently a 7/10 up to an 8/10 at its worst more bothersome with bending, walking, running and is very constant in the last couple of months.  He is active however reports that the pain can be debilitating at time and it does definitely limit his mobility.  He has undergone various epidural injections without significant relief.  He is here to discuss what surgical options may be available to him.  No LE weakness.  No saddle anaesthesia. No bowel/bladder incontinence. Most activities will exacerbate his pain. Nothing seems to offer him relief. The predominance of his pain is axial in nature.  The discogram was positive at L4-5 greater than L3-4        The pts PMH, PSH, ROS, Meds, Allergies, SH, FH are all unchanged and summarized in the pts health history from last visit        PHYSICAL EXAM:   Constitutional: /72 (Patient Site: Left Arm, Patient Position: Sitting)  Pulse (!) 58     Mental Status: A & O in no acute distress.  Affect is appropriate.  Speech is fluent.  Recent and remote memory are intact.  Attention span and concentration are normal.     HEAD: normocephalic, atraumatic     EYES: PERRL, EOM full     NECK: supple, trachea midline normal ROM     EXTREMITIES: warm, well-perfused     Skin:  No obvious skin lesions/rashes.      Musculoskeletal: Tender to palpation throughout lumbar spine.  ROM limited in all directions.      Motor: No pronator drift of upper extremity. Normal bulk and tone all muscle groups of upper and lower extremities. Strength 5/5 bilateral hip  flexion, knee extension, dorsiflexion, extensor hallucis longus, plantar flexion and bilateral deltoid, bicep, wrist extensor, tricep, finger flexion, finger abduction, .      Sensory: Sensation intact bilaterally to light touch.       Coordination:  Heel/toe/  gait intact.        Reflexes: supinator, biceps, triceps, knee/ ankle jerk intact. nml hoffmans/ neg    babinski/ clonus.     IMAGING:     IMPRESSION:   1. Stable nonspecific straightening of the normal lumbar lordosis. 2. Moderate disc and endplate degenerative changes L3-L4 and L4-L5, mildly progressed in the interval.  3. Shallow disc osteophyte complexes L3-L4 and L4-L5 slightly effaces the caudal ventral foramina. New borderline foraminal narrowing L3-L4 with similar borderline right and mild left foraminal narrowing L4-L5. No canal stenosis.      DISCOGRAM  L3-L4  Opening pressure:  50  Closing pressure:  10  Contrast injected:   2mL   Pre-contrast pain score: 0/10  Post-contrast pain score:  4/10  CONCORDANT pain:  The pain felt similar to his usual pain, but more muscular in nature as opposed to stabbing but it was in the same location that he usually has pain on the right side.  Flow pattern:  Discogram seen.  Degenerative pattern but no extravasation of contrast dye seen.       L4-L5    Opening pressure:  50  Closing Pressure:  20  Contrast injected:  2 mL  Pre-contrast pain score:  0/10  Post-contrast pain score:  6/10  CONCORDANT pain:  This pain felt MORE TYPICAL (it was sharp like his usual pain) than at the L3-4 level with reproduction of pain on both the right and left sides.    Flow pattern:  Discogram seen.  Degenerative pattern with extravasation on the left lateral aspect of the disc and posteriorly.     L5-S1    Opening pressure:  60  Closing pressure:  50  Contrast injected:  2 mL  Pre-procedure Pain:  2/10 (patient having discomfort from lying in prone position)  Post-procedure pain score:  2/10  NO SIGNIFICANT INCREASE IN PAIN.   Patient felt pressure but no reproduction of pain.  Flow pattern:  Nucleogram seen.  No extravasation of contrast dye.  Ann-Marie disc pattern.         CONSULTATION ASSESSMENT AND PLAN:      This is a complex spine surgery follow up visit. is to have a consult with Dr. Alvarenga regarding a spinal cord stimulator for chronic pain syndrome.  Discussed potential pitfalls with surgery for DDD and recommend alternative treatments at his current level of disability.  Patient agrees with plan      Girish Dyer MD  Spine Surgeon  [facility]    CC:     Ángela London MD   [address]  [place] [address]

## 2021-06-12 NOTE — PROGRESS NOTES
Assessment:   Blas Ramirez is a 52 y.o. y.o. male with past medical history significant for asthma who presents today for follow-up regarding low back pain.  He was referred by Dr. Dyer, and is managed by Alecia Clement in the spine clinic.  His etiology of back pain appears to be discogenic.  He does have significant degeneration of the L3-4 disc.  On diagnostic discography, it revealed that potentially pain was coming from the L4-5 disc, which has more mild degeneration but does potentially have an annular tear.  The has tried and failed lumbar medial branch blocks, indicating that lumbar facet syndrome is not the cause of his pain.  He does not have any SI provocative maneuvers on exam today.  At this time he has been evaluated by Dr. Dyer who does not feel that he is a surgical candidate and referred the patient for consideration of a spinal cord stimulator.  Given that his main pain is back pain, he is a poor candidate for spinal cord stimulation..  Patient is neurologically intact and without any red flag symptoms.       Plan:     A shared decision making plan was used.  The patient's values and choices were respected.  The following represents what was discussed and decided upon by the physician and the patient.      1.  DIAGNOSTIC TESTS: Patient's MRI of the lumbar spine was personally reviewed today.  This is in PACS, and was performed earlier this year.  No further imaging studies are necessary at this time.  2.  PHYSICAL THERAPY: The patient completed the medics program in the summer 2016.  He was offered a repeat course of physical therapy, but he declines at this time.  If he would like to return to physical therapy, he can call the clinic and an order will be provided.  3.  MEDICATIONS: No changes to his medications at this time.  He can take over-the-counter analgesics as needed.  4.  INTERVENTIONS: He has tried and failed lumbar medial branch blocks in August 2016.  He has tried  and failed a right L3-4 transforaminal epidural steroid injection and a right L4-5 transfemoral epidural steroid injection in the fall 2016.  Lumbar discography had concordant pain at both the L4-5 and L3-4 levels with a control disc at the L5-S1 level.  He is not deemed to be a fusion candidate at this time.  Would not recommend further injections at this time.  Sacroiliac joints pain is not in the differential diagnosis at this time as he did not have any provocative maneuvers.  -Discussed a spinal cord stimulator trial could be performed given that he is exhausted all options, but did caution the patient that he is likely not a good candidate given that his pain is in his back.  At this time he would like to hold off on a spinal cord stimulator trial.  He was given an information packet, and if he has questions he would be welcome to call the clinic or a complement reconsultation with a spinal cord stimulator wrap could be performed.  -Information given to him was from [employer], but he was told in his charge instructions that he could have a spinal cord stimulator with [employer], [employer], nephro, [employer].  Which ever company would be his preference would be the company that is used.  5.  PATIENT EDUCATION:    -Discussed that Abril Bass and Nasir Sarabia are reputable sources if he would like to look at what free resources they have available on the internet.    -All of the patient's questions were answered to his satisfaction.  He was in agreement with the treatment plan at this time.  6.  FOLLOW-UP: At this point patient can follow-up as needed.  He is welcome to follow-up at the spine clinic with either Alecia Howard or Dr. Peterson.    Subjective:     Blas Ramirez is a 52 y.o. male who presents today for follow-up regarding low back pain.  The patient reports that he has had back pain for more than 10 years.  He is following up after referral from Dr. Dyer who  does not feel that he is a surgical candidate.  Dr. Dyer wanted him considered for spinal cord stimulation.  Patient tells me that he has had back pain greater than leg pain.  The leg pain is mainly aggravated with running or with long drives.  Otherwise his leg pain is very mild.  The back pain is the main area pain.  This feels like a stabbing pain on the right side more like a muscle soreness on the left side.  The pain is worse with getting up in the morning.  Golfing, running, playing guitar will aggravate the pain.  When he transitions from sitting for prolonged period of time to standing this will also aggravate the pain.  Rolling over in bed can aggravate the pain.  He does feel little bit better when he does his core exercises.  This does seem to allow him to do more activities with less pain.  He did do the medics physical therapy program in the summer 2016.  He tries to do exercises, but states that he could do better.  Uses a machine similar to the medics machine at the gym.  He is still diligent in trying to strengthen his core.  Can can sometimes help the back to feel looser and to decrease pain.    Patient has tried numerous injections but nothing has provided relief.  He is currently rating his pain at a 5 out of 10.  At best it is a 2 out of 10.  At worst it is an 8 out of 10.  Denies any new symptoms since his last evaluation with Alecia Clement.    Past medical history is reviewed and is unchanged for any new medical diagnoses in the interim.      Family history is reviewed and is unchanged in the interim.        Review of Systems:  Negative for numbness/tingling, weakness, bowel/bladder dysfunction, foot drop, headache, dizziness, nausea/vomiting, blurred vision, balance changes.     Objective:   CONSTITUTIONAL:  Vital signs as above.  No acute distress.  The patient is well nourished and well groomed.    PSYCHIATRIC:  The patient is awake, alert, oriented to person, place and time.  The  patient is answering questions appropriately with clear speech.  Normal affect.  SKIN:  Skin over the face, posterior torso, bilateral upper and lower extremities is clean, dry, intact without rashes.  MUSCULOSKELETAL:  Gait is non-antalgic.  The patient is able to heel and toe walk without any difficulty.  Patient has pain with lumbar flexion and mild pain with lumbar extension.  She has more pain with left side bending and with right side bending, though with right side bending he has an block of the spine of the spine does not side bend as it mechanically should.  No significant pain with upper body trunk rotation.  He actually reports the rotation provides relief.  Mild tenderness over the right lower lumbar paraspinal muscles.      The patient has 5/5 strength for the bilateral hip flexors, knee flexors/extensors, ankle dorsiflexors/plantar flexors, ankle evertors/invertors.    NEUROLOGICAL: 2/4 patellar, medial hamstring, achilles reflexes which are symmetric bilaterally.  No ankle clonus bilaterally.  Sensation to light touch is intact in the bilateral L4, L5, and S1 dermatomes.       RESULTS: His MRI of the lumbar spine was personally reviewed today.  He does have endplate changes at the L3-4 level.  There is disc degeneration of the L3-4 disc.  Mild disc degeneration seen of the L4-5 disc.  Please see the report for details.

## 2021-06-19 NOTE — LETTER
Letter by Estrella Zamudio MD at      Author: Estrella Zamudio MD Service: -- Author Type: --    Filed:  Encounter Date: 9/10/2019 Status: (Other)         Blas Ramirez  [address] [facility] [address] [place] [id number]             September 10, 2019         Dear Kristen James,    Below are the results from your recent visit:    Resulted Orders   Glucose   Result Value Ref Range    Glucose 105 (H) 70 - 99 mg/dL    Patient Fasting > 8hrs? Yes     Narrative    Fasting Glucose reference range is 70-99 mg/dL per  American Diabetes Association (ADA) guidelines.   Lipid Cascade- FASTING   Result Value Ref Range    Cholesterol 184 <=199 mg/dL    Triglycerides 59 <=149 mg/dL    HDL Cholesterol 76 >=40 mg/dL    LDL Calculated 96 <=129 mg/dL    Patient Fasting > 8hrs? Yes    HIV Antigen/Antibody Screening Cascade   Result Value Ref Range    HIV Antigen / Antibody Negative Negative    Narrative    Method is Abbott HIV Ag/Ab for the detection of HIV p24 antigen, HIV-1 antibodies and HIV-2 antibodies.       HIV screening is negative (that's good!). Fasting glucose labs mildly elevated.  Recommend regular physical activity, weight loss, and limiting carbohydrate intake (e.g. Sweets, bread, rice, pasta, etc.)    Please call with questions or contact us using [facility].    Sincerely,        Electronically signed by Estrella Zamudio MD

## 2021-06-28 NOTE — PROGRESS NOTES
Progress Notes by Estrella Zamudio MD at 9/9/2019 10:10 AM     Author: Estrella Zamudio MD Service: -- Author Type: Physician    Filed: 9/9/2019 11:35 AM Encounter Date: 9/9/2019 Status: Signed    : Estrella Zamudio MD (Physician)       MALE PREVENTATIVE EXAM    Assessment and Plan:       1. Encounter for general adult medical examination without abnormal findings  Discussed one-time HIV screening with patient, he is in agreement to plan.  Also counseled patient regarding prostate cancer screening.  He does not have any family history of prostate cancer, states he did have prostate exam done at physical approximately a year and a half ago, was normal.  Denies any other symptoms.  No further testing today.  - HIV Antigen/Antibody Screening Cascade    2. Other eczema  Advised patient to use clobetasol sparingly, no more than 2 weeks at a time.  Refills were given today.  - clobetasol (TEMOVATE) 0.05 % cream; Apply topically 2 (two) times a day as needed.  Dispense: 30 g; Refill: 1    3. Screen for colon cancer  Discussed with patient colon cancer screening guidelines, colonoscopy ordered today.  - Ambulatory referral for Colonoscopy    4. Need for vaccination  I have discussed the risks and benefits of all of the vaccine components with the patient.  All questions have been answered.  - Influenza,Seasonal,Quad,INJ =/>6months    5. Encounter for screening for diabetes mellitus  6. Encounter for screening for lipoid disorders  Fasting today, will check labs.  - Glucose  - Lipid Cascade- FASTING     Next follow up:  Return in about 1 year (around 9/9/2020) for Annual physical.    Immunization Review  Adult Imm Review: Due today, orders placed    I discussed the following with the patient:   Adult Healthy Living: Importance of regular exercise  Healthy nutrition    I have had an Advance Directives discussion with the patient.    Subjective:   Chief Complaint: Blas Ramirez is an 54 y.o. male here for a  "preventative health visit.     HPI:  Additional concerns: Patient has had history of infrequent eczema, mostly on his palms or his lateral lower legs, though no significant symptoms currently.  He swims regularly, particularly in the winter, and skin can get worse at that time.  Usually just uses over-the-counter CeraVe, however, would like refill of clobetasol today.        Healthy Habits  Are you taking a daily aspirin? No  Do you typically exercising at least 40 min, 3-4 times per week?  Yes  Do you usually eat at least 4 servings of fruit and vegetables a day, include whole grains and fiber and avoid regularly eating high fat foods? Yes  Have you had an eye exam in the past two years? Yes  Do you see a dentist twice per year? NO  Do you have any concerns regarding sleep? No    Safety Screen  If you own firearms, are they secured in a locked gun cabinet or with trigger locks? The patient does not own any firearms  Do you feel you are safe where you are living?: Yes (9/9/2019 10:26 AM)  Do you feel you are safe in your relationship(s)?: Yes (9/9/2019 10:26 AM)      Review of Systems:  Please see above.  The rest of the review of systems are negative for all systems.     Cancer Screening       Status Date      COLONOSCOPY Overdue 1/2/2017           Patient Care Team:  Ángela London MD as PCP - General (Family Medicine)        History     Reviewed By Date/Time Sections Reviewed    Estrella Zamudio MD 9/9/2019 10:43 AM Social Documentation    Estrella Zamudio MD 9/9/2019 10:42 AM Family    Estrella Zamudio MD 9/9/2019 10:41 AM Surgical    [facility] 9/9/2019 10:28 AM Tobacco            Objective:   Vital Signs:   Visit Vitals  /78 (Patient Site: Right Arm, Patient Position: Sitting, Cuff Size: Adult Regular)   Pulse 64   Temp 98.2  F (36.8  C) (Oral)   Resp 16   Ht 6' 1\" (1.854 m)   Wt 193 lb 14.4 oz (88 kg)   BMI 25.58 kg/m           PHYSICAL EXAM  General Appearance: Alert, cooperative, no " distress, appears stated age  Head: Normocephalic, without obvious abnormality, atraumatic  Eyes: PERRL, conjunctiva/corneas clear, EOM's intact  Ears: Normal TM's and external ear canals, both ears  Nose: Nares normal, septum midline,mucosa normal, no drainage  Throat: Lips, mucosa, and tongue normal; teeth and gums normal  Neck: Supple, symmetrical, trachea midline, no adenopathy;  thyroid: not enlarged, symmetric, no tenderness/mass/nodules; no carotid bruit or JVD  Lungs: Clear to auscultation bilaterally, respirations unlabored  Heart: Regular rate and rhythm, S1 and S2 normal, no murmur.  Abdomen: Soft, non-tender, bowel sounds active all four quadrants,  no masses, no organomegaly  Genitourinary: Penis normal. No hernias palpated  Musculoskeletal: Normal range of motion. No joint swelling or deformity.   Extremities: Extremities normal, atraumatic, no cyanosis or edema  Skin: Skin color, texture, turgor normal, no rashes or lesions  Lymph nodes: Cervical, supraclavicular, and axillary nodes normal  Neurologic: Alert.  Normal speech.  No focal deficits.  Normal deep tendon reflexes.   Psychiatric: Normal mood and affect.  Does not appear anxious or depressed.      The ASCVD Risk score (Steen DC Jr., et al., 2013) failed to calculate for the following reasons:    Cannot find a previous HDL lab    Cannot find a previous total cholesterol lab         Medication List           Accurate as of 9/9/19 11:35 AM. If you have any questions, ask your nurse or doctor.               CHANGE how you take these medications    clobetasol 0.05 % cream  Also known as:  TEMOVATE  INSTRUCTIONS:  Apply topically 2 (two) times a day as needed.  What changed:      how to take this    when to take this  Changed by:  Estrella Zamudio MD           CONTINUE taking these medications    CERAVE Crea cream  INSTRUCTIONS:  as needed. OTC  Generic drug:  ceramides 1,3,6-11        CLARITIN ORAL  INSTRUCTIONS:  Take 1 tablet by mouth as needed.         IBUPROFEN ORAL  INSTRUCTIONS:  Take 1 tablet by mouth 2 (two) times a day as needed. OTC              Where to Get Your Medications      These medications were sent to [id number] - [facility] [address] AT [place] [address] [address], [facility] [address]    Phone:  [phone]     clobetasol 0.05 % cream         Additional Screenings Completed Today:

## 2021-06-29 NOTE — PROGRESS NOTES
Progress Notes by Estrella Zamudio MD at 9/14/2020  8:00 AM     Author: Estrella Zamudio MD Service: -- Author Type: Physician    Filed: 9/14/2020  8:47 AM Encounter Date: 9/14/2020 Status: Signed    : Estrella Zamudio MD (Physician)       MALE PREVENTATIVE EXAM    Assessment and Plan:       1. Routine general medical examination at a health care facility    2. Strain of neck muscle, initial encounter  Exam was unremarkable today.  I did offer to do x-ray of right shoulder, the patient declined at this time, given he is currently not having symptoms.  We also discussed use of Flexeril if needed, which patient would like to have on hand.  Discussed possible adverse effects of medication such as drowsiness that he should not drive or use machinery soon after taking medication.  Follow-up if symptoms persist or worsen.  - cyclobenzaprine (FLEXERIL) 5 MG tablet; Take 1 tablet (5 mg total) by mouth 3 (three) times a day as needed for muscle spasms.  Dispense: 30 tablet; Refill: 0    3. Lateral pain of hip  No red flag symptoms, and discussed referral to physical therapy, the patient declined at this time.  He will continue to monitor symptoms, if they worsen, return to clinic for reevaluation.  Could consider imaging in the future.    4. Need for vaccination  I have discussed the risks and benefits of all of the vaccine components with the patient.  All questions have been answered.  - Td, Preservative Free (green label)  - Influenza, Seasonal Quad, PF =/> 6months    5. Encounter for screening for diabetes mellitus  6. Encounter for screening for lipoid disorders  Fasting today, will check labs.  - Glucose  - Lipid Cascade- FASTING     Next follow up:  Return in about 1 year (around 9/14/2021) for Annual physical.    Immunization Review  Adult Imm Review: Due today, orders placed    I discussed the following with the patient:   Adult Healthy Living: Importance of regular exercise  Healthy nutrition      Subjective:    Chief Complaint: Blas Ramirez is an 55 y.o. male here for a preventative health visit.     HPI:  Additional concerns:  Patient has noted periodic posterior right neck strain.  He fell in December, 2019 and ordered some shoulder pain at that time, however it has now resolved.  At the time also had some decreased range of motion, though this has also resolved.  No pain currently.    Recently has taken a few road trips and has also noted that if he is sitting in the car for long periods of time, he will note bilateral lateral pain in his hip region.  This occurs only in the car, no problems if he is sitting on the couch for long periods of time, for example.  No pain with walking.  No fevers, chills, sweats.  No bowel or bladder incontinence.  No weakness with walking.    Healthy Habits  Are you taking a daily aspirin? No  Do you typically exercising at least 40 min, 3-4 times per week?  Yes  Do you usually eat at least 4 servings of fruit and vegetables a day, include whole grains and fiber and avoid regularly eating high fat foods? Yes  Have you had an eye exam in the past two years? Yes  Do you see a dentist twice per year? NO  Do you have any concerns regarding sleep? No    Safety Screen  If you own firearms, are they secured in a locked gun cabinet or with trigger locks? The patient does not own any firearms  Do you feel you are safe where you are living?: Yes (9/14/2020  8:05 AM)  Do you feel you are safe in your relationship(s)?: Yes (9/14/2020  8:05 AM)      Review of Systems:  Please see above.  The rest of the review of systems are negative for all systems.     Cancer Screening     Patient has no health maintenance due at this time          Patient Care Team:  Ángela London MD as PCP - General (Family Medicine)  Estrella Zamudio MD as Assigned PCP        History     Reviewed By Date/Time Sections Reviewed    Estrella Zamudio MD 9/14/2020  8:17 AM Medical, Surgical, Tobacco, Alcohol, Drug  "Use, Sexual Activity, Family, Social Documentation    Tiffany ChesterPRITI 9/14/2020  8:07 AM Tobacco            Objective:   Vital Signs:   Visit Vitals  /76 (Patient Site: Right Arm, Patient Position: Sitting, Cuff Size: Adult Large)   Pulse (!) 58   Temp 97.3  F (36.3  C) (Tympanic)   Resp 18   Ht 6' 1.75\" (1.873 m)   Wt 199 lb 14.4 oz (90.7 kg)   BMI 25.84 kg/m           PHYSICAL EXAM  General Appearance: Alert, cooperative, no distress, appears stated age  Head: Normocephalic, without obvious abnormality, atraumatic  Eyes: PERRL, conjunctiva/corneas clear, EOM's intact  Ears: Normal TM's and external ear canals, both ears  Nose: Nares normal, septum midline,mucosa normal, no drainage  Throat: Lips, mucosa, and tongue normal; teeth and gums normal  Neck: Supple, symmetrical, trachea midline, no adenopathy;  thyroid: not enlarged, symmetric, no tenderness/mass/nodules; no carotid bruit or JVD  Lungs: Clear to auscultation bilaterally, respirations unlabored  Heart: Regular rate and rhythm, S1 and S2 normal, no murmur.  Abdomen: Soft, non-tender, bowel sounds active all four quadrants,  no masses, no organomegaly  Genitourinary: Penis normal. No hernias palpated  Musculoskeletal: Normal range of motion. No joint swelling or deformity. No tenderness palpation along posterior neck or shoulder bilaterally.  Shoulder joints with full range of motion with abduction and abduction bilaterally.  Negative empty can test bilaterally.  Negative Shannon Camron test bilaterally.  Straight leg test is negative bilaterally.  Extremities: Extremities normal, atraumatic, no cyanosis or edema  Skin: Skin color, texture, turgor normal, no rashes or lesions  Lymph nodes: Cervical, supraclavicular, and axillary nodes normal  Neurologic: Alert.  Normal speech.  No focal deficits.  Unable to elicit patella tendon reflexes, though symmetric.    Psychiatric: Normal mood and affect.  Does not appear anxious or depressed.        The " 10-year ASCVD risk score (Lane LEMUS Jr., et al., 2013) is: 2.5%    Values used to calculate the score:      Age: 55 years      Sex: Male      Is Non- : No      Diabetic: No      Tobacco smoker: No      Systolic Blood Pressure: 120 mmHg      Is BP treated: No      HDL Cholesterol: 76 mg/dL      Total Cholesterol: 184 mg/dL         Medication List          Accurate as of September 14, 2020  8:44 AM. If you have any questions, ask your nurse or doctor.            START taking these medications    cyclobenzaprine 5 MG tablet  Also known as:  FLEXERIL  INSTRUCTIONS:  Take 1 tablet (5 mg total) by mouth 3 (three) times a day as needed for muscle spasms.  Started by:  Estrella Zamudio MD           CONTINUE taking these medications    CeraVe Crea cream  INSTRUCTIONS:  as needed. OTC  Generic drug:  ceramides 1,3,6-11        CLARITIN ORAL  INSTRUCTIONS:  Take 1 tablet by mouth as needed.        clobetasoL 0.05 % cream  Also known as:  TEMOVATE  INSTRUCTIONS:  Apply topically 2 (two) times a day as needed.        IBUPROFEN ORAL  INSTRUCTIONS:  Take 1 tablet by mouth 2 (two) times a day as needed. OTC              Where to Get Your Medications      These medications were sent to [id number] [place] [address] [facility] [place] [address] [address] [facility] [address]    Phone:  [phone]     cyclobenzaprine 5 MG tablet         Additional Screenings Completed Today:

## 2021-09-18 ENCOUNTER — HEALTH MAINTENANCE LETTER (OUTPATIENT)
Age: 56
End: 2021-09-18

## 2021-11-13 ENCOUNTER — HEALTH MAINTENANCE LETTER (OUTPATIENT)
Age: 56
End: 2021-11-13

## 2022-11-20 ENCOUNTER — HEALTH MAINTENANCE LETTER (OUTPATIENT)
Age: 57
End: 2022-11-20

## 2023-11-25 ENCOUNTER — HEALTH MAINTENANCE LETTER (OUTPATIENT)
Age: 58
End: 2023-11-25